# Patient Record
Sex: MALE | Race: WHITE | NOT HISPANIC OR LATINO | URBAN - METROPOLITAN AREA
[De-identification: names, ages, dates, MRNs, and addresses within clinical notes are randomized per-mention and may not be internally consistent; named-entity substitution may affect disease eponyms.]

---

## 2024-07-04 ENCOUNTER — CLAIMS CREATED FROM THE CLAIM WINDOW (OUTPATIENT)
Dept: URBAN - METROPOLITAN AREA MEDICAL CENTER 1 | Facility: MEDICAL CENTER | Age: 24
End: 2024-07-04
Payer: COMMERCIAL

## 2024-07-04 DIAGNOSIS — R10.84 ABDOMINAL CRAMPING, GENERALIZED: ICD-10-CM

## 2024-07-04 DIAGNOSIS — K50.811 CROHN'S DISEASE OF BOTH SMALL AND LARGE INTESTINE WITH RECTAL BLEEDING: ICD-10-CM

## 2024-07-04 PROCEDURE — 99222 1ST HOSP IP/OBS MODERATE 55: CPT | Performed by: INTERNAL MEDICINE

## 2024-07-04 PROCEDURE — 99254 IP/OBS CNSLTJ NEW/EST MOD 60: CPT | Performed by: INTERNAL MEDICINE

## 2024-07-04 PROCEDURE — G8427 DOCREV CUR MEDS BY ELIG CLIN: HCPCS | Performed by: INTERNAL MEDICINE

## 2024-07-04 PROCEDURE — 99204 OFFICE O/P NEW MOD 45 MIN: CPT | Performed by: INTERNAL MEDICINE

## 2024-07-05 ENCOUNTER — CLAIMS CREATED FROM THE CLAIM WINDOW (OUTPATIENT)
Dept: URBAN - METROPOLITAN AREA MEDICAL CENTER 1 | Facility: MEDICAL CENTER | Age: 24
End: 2024-07-05
Payer: COMMERCIAL

## 2024-07-05 DIAGNOSIS — K50.811 CROHN'S DISEASE OF BOTH SMALL AND LARGE INTESTINE WITH RECTAL BLEEDING: ICD-10-CM

## 2024-07-05 DIAGNOSIS — R10.84 ABDOMINAL CRAMPING, GENERALIZED: ICD-10-CM

## 2024-07-05 PROCEDURE — 99214 OFFICE O/P EST MOD 30 MIN: CPT | Performed by: INTERNAL MEDICINE

## 2024-07-05 PROCEDURE — 99232 SBSQ HOSP IP/OBS MODERATE 35: CPT | Performed by: INTERNAL MEDICINE

## 2024-07-08 ENCOUNTER — TELEPHONE ENCOUNTER (OUTPATIENT)
Dept: URBAN - NONMETROPOLITAN AREA CLINIC 2 | Facility: CLINIC | Age: 24
End: 2024-07-08

## 2024-07-10 ENCOUNTER — OFFICE VISIT (OUTPATIENT)
Dept: URBAN - NONMETROPOLITAN AREA CLINIC 13 | Facility: CLINIC | Age: 24
End: 2024-07-10

## 2024-07-10 ENCOUNTER — LAB OUTSIDE AN ENCOUNTER (OUTPATIENT)
Dept: URBAN - NONMETROPOLITAN AREA CLINIC 2 | Facility: CLINIC | Age: 24
End: 2024-07-10

## 2024-07-10 ENCOUNTER — LAB OUTSIDE AN ENCOUNTER (OUTPATIENT)
Dept: URBAN - NONMETROPOLITAN AREA CLINIC 13 | Facility: CLINIC | Age: 24
End: 2024-07-10

## 2024-07-10 ENCOUNTER — TELEPHONE ENCOUNTER (OUTPATIENT)
Dept: URBAN - NONMETROPOLITAN AREA CLINIC 2 | Facility: CLINIC | Age: 24
End: 2024-07-10

## 2024-07-10 ENCOUNTER — DASHBOARD ENCOUNTERS (OUTPATIENT)
Age: 24
End: 2024-07-10

## 2024-07-10 ENCOUNTER — OFFICE VISIT (OUTPATIENT)
Dept: URBAN - NONMETROPOLITAN AREA CLINIC 13 | Facility: CLINIC | Age: 24
End: 2024-07-10
Payer: COMMERCIAL

## 2024-07-10 VITALS — BODY MASS INDEX: 26.51 KG/M2 | WEIGHT: 185.2 LBS | HEIGHT: 70 IN

## 2024-07-10 DIAGNOSIS — Z98.890 HISTORY OF COLONOSCOPY: ICD-10-CM

## 2024-07-10 DIAGNOSIS — M25.50 MULTIPLE JOINT PAIN: ICD-10-CM

## 2024-07-10 DIAGNOSIS — K50.014 CROHN'S DISEASE OF SMALL INTESTINE WITH ABSCESS: ICD-10-CM

## 2024-07-10 PROBLEM — 56689002: Status: ACTIVE | Noted: 2024-07-10

## 2024-07-10 PROBLEM — 35678005: Status: ACTIVE | Noted: 2024-07-10

## 2024-07-10 PROBLEM — 851000119109: Status: ACTIVE | Noted: 2024-07-10

## 2024-07-10 LAB — HEPATITIS B SURFACE ANTIGEN: (no result)

## 2024-07-10 PROCEDURE — 99214 OFFICE O/P EST MOD 30 MIN: CPT | Performed by: NURSE PRACTITIONER

## 2024-07-10 RX ORDER — SODIUM, POTASSIUM,MAG SULFATES 17.5-3.13G
177 MILLILITER SOLUTION, RECONSTITUTED, ORAL ORAL
Qty: 1 UNSPECIFIED | Refills: 0 | OUTPATIENT
Start: 2024-07-10 | End: 2024-07-11

## 2024-07-10 RX ORDER — ESCITALOPRAM OXALATE 10 MG/1
1 TABLET TABLET, FILM COATED ORAL ONCE A DAY
Status: ACTIVE | COMMUNITY

## 2024-07-10 NOTE — HPI-TODAY'S VISIT:
7/10/2024 Rolando presents for [facility] follow-up.  He was diagnosed with Crohn's disease in 2021 with an abscess of the terminal ileum.  This was managed medically.  He was placed on infliximab 5 mg/kg every 8 weeks initially.  He was transition to Avsola due to insurance change.  His colonoscopy in 2022 shows no active disease endoscopically or histologically.  He missed a dose in summer 2023 of his infliximab and developed antibodies.  He started Stelara in July 2023 and has been compliant with his maintenance therapy at 90 mcg every 8 weeks.  Early July 2024 he developed abdominal pain nausea vomiting and diarrhea.  He presented to the emergency department with ileitis on CT scan.  He was treated with steroids initially.  Yesterday he developed a fever over.  He went back to the emergency room with no profound leukocytosis his lactate and his CRP were normal.  They started him on Flagyl and Cipro.  His abdomen is still somewhat tender but no further fever.  He is tolerating low residue diet.  Today he agrees to labs, will schedule colonoscopy from when he gets back from [place] and discuss dose escalation of his Stelara versus Skyrizi versus Rinvoq.  MB

## 2024-07-13 LAB
HEPATITIS B CORE TOTAL ANTIBODY: NONREACTIVE
MITOGEN-NIL: 1.66
NIL: 0.14
QUANTIFERON-TB PLUS,1T: NEGATIVE
TB1-NIL: 0.01
TB2-NIL: 0.01

## 2024-07-15 ENCOUNTER — TELEPHONE ENCOUNTER (OUTPATIENT)
Dept: URBAN - NONMETROPOLITAN AREA CLINIC 13 | Facility: CLINIC | Age: 24
End: 2024-07-15

## 2024-07-15 ENCOUNTER — OFFICE VISIT (OUTPATIENT)
Dept: URBAN - NONMETROPOLITAN AREA SURGERY CENTER 1 | Facility: SURGERY CENTER | Age: 24
End: 2024-07-15

## 2024-07-18 ENCOUNTER — OFFICE VISIT (OUTPATIENT)
Dept: URBAN - NONMETROPOLITAN AREA CLINIC 2 | Facility: CLINIC | Age: 24
End: 2024-07-18

## 2024-07-19 LAB — MISC RESULTS: (no result)

## 2024-07-22 ENCOUNTER — OFFICE VISIT (OUTPATIENT)
Dept: URBAN - NONMETROPOLITAN AREA SURGERY CENTER 1 | Facility: SURGERY CENTER | Age: 24
End: 2024-07-22

## 2024-07-23 ENCOUNTER — TELEPHONE ENCOUNTER (OUTPATIENT)
Dept: URBAN - NONMETROPOLITAN AREA CLINIC 2 | Facility: CLINIC | Age: 24
End: 2024-07-23

## 2024-08-01 ENCOUNTER — LAB OUTSIDE AN ENCOUNTER (OUTPATIENT)
Dept: URBAN - NONMETROPOLITAN AREA CLINIC 2 | Facility: CLINIC | Age: 24
End: 2024-08-01

## 2024-08-01 ENCOUNTER — OFFICE VISIT (OUTPATIENT)
Dept: URBAN - METROPOLITAN AREA MEDICAL CENTER 1 | Facility: MEDICAL CENTER | Age: 24
End: 2024-08-01
Payer: COMMERCIAL

## 2024-08-01 DIAGNOSIS — K50.80 CROHN'S COLITIS: ICD-10-CM

## 2024-08-01 PROCEDURE — 45380 COLONOSCOPY AND BIOPSY: CPT | Performed by: INTERNAL MEDICINE

## 2024-08-01 RX ORDER — ESCITALOPRAM OXALATE 10 MG/1
1 TABLET TABLET, FILM COATED ORAL ONCE A DAY
Status: ACTIVE | COMMUNITY

## 2024-08-02 LAB
AP CASE REPORT: (no result)
AP CLINICAL INFORMATION: (no result)
AP FINAL DIAGNOSIS: (no result)
AP GROSS DESCRIPTION: (no result)
AP MICROSCOPIC DESCRIPTION: (no result)

## 2024-09-03 ENCOUNTER — TELEPHONE ENCOUNTER (OUTPATIENT)
Dept: URBAN - NONMETROPOLITAN AREA CLINIC 2 | Facility: CLINIC | Age: 24
End: 2024-09-03

## 2024-09-11 LAB
A/G RATIO: 2.1
ABSOLUTE BASOPHILS: 51
ABSOLUTE EOSINOPHILS: 51
ABSOLUTE LYMPHOCYTES: 1183
ABSOLUTE MONOCYTES: 490
ABSOLUTE NEUTROPHILS: 3325
ALBUMIN: 4.1
ALKALINE PHOSPHATASE: 65
ALT (SGPT): 13
AST (SGOT): 12
BASOPHILS: 1
BILIRUBIN, TOTAL: 0.9
BUN/CREATININE RATIO: (no result)
BUN: 10
C-REACTIVE PROTEIN, QUANT: <3
CALCIUM: 9
CARBON DIOXIDE, TOTAL: 27
CHLORIDE: 104
CREATININE: 1.02
EGFR: 106
EOSINOPHILS: 1
GLOBULIN, TOTAL: 2
GLUCOSE: 126
HEMATOCRIT: 41
HEMOGLOBIN: 13.1
LYMPHOCYTES: 23.2
MCH: 27.3
MCHC: 32
MCV: 85.4
MONOCYTES: 9.6
MPV: 11.9
NEUTROPHILS: 65.2
PLATELET COUNT: 244
POTASSIUM: 3.5
PROTEIN, TOTAL: 6.1
RDW: 12.8
RED BLOOD CELL COUNT: 4.8
SED RATE BY MODIFIED: 2
SODIUM: 143
USTEKINUMAB AB, S: <10
USTEKINUMAB: 5.7
WHITE BLOOD CELL COUNT: 5.1

## 2024-09-13 ENCOUNTER — WEB ENCOUNTER (OUTPATIENT)
Dept: URBAN - NONMETROPOLITAN AREA CLINIC 2 | Facility: CLINIC | Age: 24
End: 2024-09-13

## 2024-09-13 ENCOUNTER — TELEPHONE ENCOUNTER (OUTPATIENT)
Dept: URBAN - NONMETROPOLITAN AREA CLINIC 2 | Facility: CLINIC | Age: 24
End: 2024-09-13

## 2024-09-16 LAB
CALPROTECTIN, FECAL: 35
CLOSTRIDIUM DIFFICILE: (no result)
LEUKOCYTES: (no result)
SALMONELLA AND SHIGELLA, CULTURE: (no result)
SHIGA TOXINS, EIA W/RFL TO E.COLI O157 CULTURE: (no result)

## 2024-10-03 ENCOUNTER — OFFICE VISIT (OUTPATIENT)
Dept: URBAN - NONMETROPOLITAN AREA CLINIC 2 | Facility: CLINIC | Age: 24
End: 2024-10-03
Payer: COMMERCIAL

## 2024-10-03 VITALS
SYSTOLIC BLOOD PRESSURE: 107 MMHG | DIASTOLIC BLOOD PRESSURE: 71 MMHG | WEIGHT: 180 LBS | TEMPERATURE: 98 F | BODY MASS INDEX: 25.77 KG/M2 | HEIGHT: 70 IN | HEART RATE: 82 BPM

## 2024-10-03 DIAGNOSIS — Z98.890 HISTORY OF COLONOSCOPY: ICD-10-CM

## 2024-10-03 DIAGNOSIS — M25.50 MULTIPLE JOINT PAIN: ICD-10-CM

## 2024-10-03 DIAGNOSIS — K50.014 CROHN'S DISEASE OF SMALL INTESTINE WITH ABSCESS: ICD-10-CM

## 2024-10-03 PROCEDURE — 99214 OFFICE O/P EST MOD 30 MIN: CPT | Performed by: NURSE PRACTITIONER

## 2024-10-03 RX ORDER — ESCITALOPRAM OXALATE 10 MG/1
1 TABLET TABLET, FILM COATED ORAL ONCE A DAY
Status: ACTIVE | COMMUNITY

## 2024-10-03 NOTE — HPI-TODAY'S VISIT:
7/10/2024 Rolando presents for [facility] follow-up.  He was diagnosed with Crohn's disease in 2021 with an abscess of the terminal ileum.  This was managed medically.  He was placed on infliximab 5 mg/kg every 8 weeks initially.  He was transition to Avsola due to insurance change.  His colonoscopy in 2022 shows no active disease endoscopically or histologically.  He missed a dose in summer 2023 of his infliximab and developed antibodies.  He started Stelara in July 2023 and has been compliant with his maintenance therapy at 90 mcg every 8 weeks.  Early July 2024 he developed abdominal pain nausea vomiting and diarrhea.  He presented to the emergency department with ileitis on CT scan.  He was treated with steroids initially.  Yesterday he developed a fever over.  He went back to the emergency room with no profound leukocytosis his lactate and his CRP were normal.  They started him on Flagyl and Cipro.  His abdomen is still somewhat tender but no further fever.  He is tolerating low residue diet.  Today he agrees to labs, will schedule colonoscopy from when he gets back from [place] and discuss dose escalation of his Stelara versus Skyrizi versus Rinvoq.  MB 10/3/2024 Rolando presents for follow-up of Crohn's disease.  Since his last visit his colonoscopy is normal including his mucosal changes and small bowel and colonic biopsies.  He is on Stelara 90 mcg every 8 weeks.  He had a small flare 3 weeks ago with abdominal cramping and diarrhea.  He started prednisone over the weekend.  His blood work including his Stelara levels and his fecal calprotectin were normal.  However he was already on steroids.  He is off steroids now for 2 weeks, his bowels are moving regularly.  He denies any abdominal pain.  He took his Stelara dose at the end of September.  Today he is doing much better in regard to his GI complaints.  MB

## 2024-12-02 ENCOUNTER — TELEPHONE ENCOUNTER (OUTPATIENT)
Dept: URBAN - NONMETROPOLITAN AREA CLINIC 2 | Facility: CLINIC | Age: 24
End: 2024-12-02

## 2024-12-03 ENCOUNTER — TELEPHONE ENCOUNTER (OUTPATIENT)
Dept: URBAN - NONMETROPOLITAN AREA CLINIC 2 | Facility: CLINIC | Age: 24
End: 2024-12-03

## 2024-12-03 ENCOUNTER — OFFICE VISIT (OUTPATIENT)
Dept: URBAN - NONMETROPOLITAN AREA CLINIC 2 | Facility: CLINIC | Age: 24
End: 2024-12-03
Payer: COMMERCIAL

## 2024-12-03 VITALS
DIASTOLIC BLOOD PRESSURE: 73 MMHG | HEIGHT: 70 IN | SYSTOLIC BLOOD PRESSURE: 113 MMHG | WEIGHT: 173.6 LBS | HEART RATE: 66 BPM | BODY MASS INDEX: 24.85 KG/M2

## 2024-12-03 DIAGNOSIS — Z98.890 HISTORY OF COLONOSCOPY: ICD-10-CM

## 2024-12-03 DIAGNOSIS — R63.4 WEIGHT LOSS: ICD-10-CM

## 2024-12-03 DIAGNOSIS — K50.014 CROHN'S DISEASE OF SMALL INTESTINE WITH ABSCESS: ICD-10-CM

## 2024-12-03 DIAGNOSIS — R17 ELEVATED BILIRUBIN: ICD-10-CM

## 2024-12-03 DIAGNOSIS — M25.50 MULTIPLE JOINT PAIN: ICD-10-CM

## 2024-12-03 PROCEDURE — 99214 OFFICE O/P EST MOD 30 MIN: CPT | Performed by: NURSE PRACTITIONER

## 2024-12-03 RX ORDER — BUDESONIDE 3 MG/1
3 CAPSULES CAPSULE, DELAYED RELEASE PELLETS ORAL ONCE A DAY
Qty: 168 CAPSULE | Refills: 0 | OUTPATIENT
Start: 2024-12-03

## 2024-12-03 RX ORDER — METRONIDAZOLE 500 MG/1
1 TABLET TABLET ORAL THREE TIMES A DAY
Qty: 30 TABLET | Refills: 0 | OUTPATIENT
Start: 2024-12-03 | End: 2024-12-13

## 2024-12-03 RX ORDER — ESCITALOPRAM OXALATE 10 MG/1
1 TABLET TABLET, FILM COATED ORAL ONCE A DAY
Status: ACTIVE | COMMUNITY

## 2024-12-03 RX ORDER — ESCITALOPRAM 10 MG/1
TAKE 1 AND 1/2 TABLETS BY MOUTH DAILY TABLET, FILM COATED ORAL
Qty: 45 UNSPECIFIED | Refills: 3 | Status: ACTIVE | COMMUNITY

## 2024-12-03 RX ORDER — PREDNISONE 10 MG/1
TAKE THREE TABLETS BY MOUTH TWICE A DAY FOR 5 DAYS THEN TAKE TWO TABLETS BY MOUTH TWICE A DAY FOR 3 DAYS THEN TAKE ONE TABLET BY MOUTH TWICE TABLET ORAL
Qty: 51 UNSPECIFIED | Refills: 0 | Status: ACTIVE | COMMUNITY

## 2024-12-03 NOTE — HPI-TODAY'S VISIT:
7/10/2024 Rolando presents for [facility] follow-up.  He was diagnosed with Crohn's disease in 2021 with an abscess of the terminal ileum.  This was managed medically.  He was placed on infliximab 5 mg/kg every 8 weeks initially.  He was transition to Avsola due to insurance change.  His colonoscopy in 2022 shows no active disease endoscopically or histologically.  He missed a dose in summer 2023 of his infliximab and developed antibodies.  He started Stelara in July 2023 and has been compliant with his maintenance therapy at 90 mcg every 8 weeks.  Early July 2024 he developed abdominal pain nausea vomiting and diarrhea.  He presented to the emergency department with ileitis on CT scan.  He was treated with steroids initially.  Yesterday he developed a fever over.  He went back to the emergency room with no profound leukocytosis his lactate and his CRP were normal.  They started him on Flagyl and Cipro.  His abdomen is still somewhat tender but no further fever.  He is tolerating low residue diet.  Today he agrees to labs, will schedule colonoscopy from when he gets back from [place] and discuss dose escalation of his Stelara versus Skyrizi versus Rinvoq.  MB 10/3/2024 Rolando presents for follow-up of Crohn's disease.  Since his last visit his colonoscopy is normal including his mucosal changes and small bowel and colonic biopsies.  He is on Stelara 90 mcg every 8 weeks.  He had a small flare 3 weeks ago with abdominal cramping and diarrhea.  He started prednisone over the weekend.  His blood work including his Stelara levels and his fecal calprotectin were normal.  However he was already on steroids.  He is off steroids now for 2 weeks, his bowels are moving regularly.  He denies any abdominal pain.  He took his Stelara dose at the end of September.  Today he is doing much better in regard to his GI complaints.  MB 12/3/2024 Rolando presents for follow-up.  Since his last visit he was doing well.  His took his Stelara dose on Thanksgiving.  The following Sunday morning he woke up and intense abdominal pain.  He typically does not have diarrhea with his flares he was actually feeling somewhat constipated.  He tried to sip on some Gatorade and MiraLAX and go to work, his pain became worse and he went to the emergency room.  His CBC is normal, his CMP shows an elevated T. bili.  His CT scan shows segmental ileitis with acute on chronic inflammation.  His colonoscopy in August shows normal terminal ileum normal colon and normal random biopsies.  He has been compliant with his Stelara every 8 weeks.  He started prednisone and was given Cipro.  His abdominal pain is improving.  Today he agrees to add Flagyl as he is very concerned about a recurrent abscess, we will transition him from p.o. steroids to budesonide 9 mg daily for 8 weeks.  We will draw his Stelara titers, if he has developed antibodies consider dose escalation versus drug alternative, if his serum drug level is normal with no antibodies would also consider alternative therapy with written Lavern versus Skyrizi.  Will discuss with Dr. Carreno.  MB

## 2024-12-07 ENCOUNTER — WEB ENCOUNTER (OUTPATIENT)
Dept: URBAN - NONMETROPOLITAN AREA CLINIC 2 | Facility: CLINIC | Age: 24
End: 2024-12-07

## 2024-12-11 LAB
ABSOLUTE BASOPHILS: 9
ABSOLUTE EOSINOPHILS: 0
ABSOLUTE LYMPHOCYTES: 517
ABSOLUTE MONOCYTES: 432
ABSOLUTE NEUTROPHILS: 8441
ALBUMIN/GLOBULIN RATIO: 2
ALBUMIN: 4.7
ALKALINE PHOSPHATASE: 68
ALT (SGPT): 13
AST (SGOT): 11
BASOPHILS: 0.1
BILIRUBIN, DIRECT: 0.2
BILIRUBIN, INDIRECT: 0.6
BILIRUBIN, TOTAL: 0.8
BUN/CREATININE RATIO: (no result)
CALCIUM: 9.6
CARBON DIOXIDE: 28
CHLORIDE: 103
CREATININE: 0.9
EGFR: 123
EOSINOPHILS: 0
GLOBULIN: 2.3
GLUCOSE: 117
HEMATOCRIT: 44.3
HEMOGLOBIN: 13.9
HEPATITIS B CORE AB TOTAL: (no result)
HEPATITIS B SURFACE ANTIGEN: (no result)
LYMPHOCYTES: 5.5
MCH: 26.6
MCHC: 31.4
MCV: 84.9
MITOGEN-NIL: 0.69
MONOCYTES: 4.6
MPV: 12.6
NEUTROPHILS: 89.8
PLATELET COUNT: 263
POTASSIUM: 4.5
PROTEIN, TOTAL: 7
QUANTIFERON NIL VALUE: 0.01
QUANTIFERON TB1 AG VALUE: 0
QUANTIFERON TB2 AG VALUE: 0.01
QUANTIFERON-TB GOLD PLUS: NEGATIVE
RDW: 15.2
RED BLOOD CELL COUNT: 5.22
SODIUM: 140
TSH W/REFLEX TO FT4: 0.41
UREA NITROGEN (BUN): 14
USTEKINUMAB AB, S: <10
USTEKINUMAB: >10
WHITE BLOOD CELL COUNT: 9.4

## 2024-12-17 ENCOUNTER — WEB ENCOUNTER (OUTPATIENT)
Dept: URBAN - NONMETROPOLITAN AREA CLINIC 2 | Facility: CLINIC | Age: 24
End: 2024-12-17

## 2024-12-20 ENCOUNTER — OFFICE VISIT (OUTPATIENT)
Dept: URBAN - METROPOLITAN AREA TELEHEALTH 2 | Facility: TELEHEALTH | Age: 24
End: 2024-12-20
Payer: COMMERCIAL

## 2024-12-20 DIAGNOSIS — K50.014 CROHN'S DISEASE OF SMALL INTESTINE WITH ABSCESS: ICD-10-CM

## 2024-12-20 DIAGNOSIS — R17 ELEVATED BILIRUBIN: ICD-10-CM

## 2024-12-20 DIAGNOSIS — Z98.890 HISTORY OF COLONOSCOPY: ICD-10-CM

## 2024-12-20 DIAGNOSIS — R63.4 WEIGHT LOSS: ICD-10-CM

## 2024-12-20 DIAGNOSIS — M25.50 MULTIPLE JOINT PAIN: ICD-10-CM

## 2024-12-20 PROBLEM — 89362005: Status: ACTIVE | Noted: 2024-12-03

## 2024-12-20 PROBLEM — 26165005: Status: ACTIVE | Noted: 2024-12-03

## 2024-12-20 PROCEDURE — 99214 OFFICE O/P EST MOD 30 MIN: CPT | Performed by: NURSE PRACTITIONER

## 2024-12-20 RX ORDER — PREDNISONE 10 MG/1
TAKE THREE TABLETS BY MOUTH TWICE A DAY FOR 5 DAYS THEN TAKE TWO TABLETS BY MOUTH TWICE A DAY FOR 3 DAYS THEN TAKE ONE TABLET BY MOUTH TWICE TABLET ORAL
Qty: 51 UNSPECIFIED | Refills: 0 | Status: ACTIVE | COMMUNITY

## 2024-12-20 RX ORDER — ESCITALOPRAM 10 MG/1
TAKE 1 AND 1/2 TABLETS BY MOUTH DAILY TABLET, FILM COATED ORAL
Qty: 45 UNSPECIFIED | Refills: 3 | Status: ACTIVE | COMMUNITY

## 2024-12-20 RX ORDER — ESCITALOPRAM OXALATE 10 MG/1
1 TABLET TABLET, FILM COATED ORAL ONCE A DAY
Status: ACTIVE | COMMUNITY

## 2024-12-20 RX ORDER — UPADACITINIB 15 MG/1
1 TABLET TABLET, EXTENDED RELEASE ORAL ONCE A DAY
Qty: 90 TABLET | Refills: 3 | OUTPATIENT
Start: 2024-12-20 | End: 2025-12-15

## 2024-12-20 RX ORDER — UPADACITINIB 45 MG/1
1 TABLET DAILY FOR 12 WEEKS INDUCTION TABLET, EXTENDED RELEASE ORAL ONCE DAILY
Qty: 30 TABLETS | Refills: 2 | OUTPATIENT
Start: 2024-12-20 | End: 2025-03-20

## 2024-12-20 RX ORDER — BUDESONIDE 3 MG/1
3 CAPSULES CAPSULE, DELAYED RELEASE PELLETS ORAL ONCE A DAY
Qty: 168 CAPSULE | Refills: 0 | Status: ACTIVE | COMMUNITY
Start: 2024-12-03

## 2024-12-20 NOTE — HPI-TODAY'S VISIT:
7/10/2024 Rolando presents for [facility] follow-up.  He was diagnosed with Crohn's disease in 2021 with an abscess of the terminal ileum.  This was managed medically.  He was placed on infliximab 5 mg/kg every 8 weeks initially.  He was transition to Avsola due to insurance change.  His colonoscopy in 2022 shows no active disease endoscopically or histologically.  He missed a dose in summer 2023 of his infliximab and developed antibodies.  He started Stelara in July 2023 and has been compliant with his maintenance therapy at 90 mcg every 8 weeks.  Early July 2024 he developed abdominal pain nausea vomiting and diarrhea.  He presented to the emergency department with ileitis on CT scan.  He was treated with steroids initially.  Yesterday he developed a fever over.  He went back to the emergency room with no profound leukocytosis his lactate and his CRP were normal.  They started him on Flagyl and Cipro.  His abdomen is still somewhat tender but no further fever.  He is tolerating low residue diet.  Today he agrees to labs, will schedule colonoscopy from when he gets back from [place] and discuss dose escalation of his Stelara versus Skyrizi versus Rinvoq.  MB 10/3/2024 Rolando presents for follow-up of Crohn's disease.  Since his last visit his colonoscopy is normal including his mucosal changes and small bowel and colonic biopsies.  He is on Stelara 90 mcg every 8 weeks.  He had a small flare 3 weeks ago with abdominal cramping and diarrhea.  He started prednisone over the weekend.  His blood work including his Stelara levels and his fecal calprotectin were normal.  However he was already on steroids.  He is off steroids now for 2 weeks, his bowels are moving regularly.  He denies any abdominal pain.  He took his Stelara dose at the end of September.  Today he is doing much better in regard to his GI complaints.  MB 12/3/2024 Rolando presents for follow-up.  Since his last visit he was doing well.  His took his Stelara dose on Thanksgiving.  The following Sunday morning he woke up and intense abdominal pain.  He typically does not have diarrhea with his flares he was actually feeling somewhat constipated.  He tried to sip on some Gatorade and MiraLAX and go to work, his pain became worse and he went to the emergency room.  His CBC is normal, his CMP shows an elevated T. bili.  His CT scan shows segmental ileitis with acute on chronic inflammation.  His colonoscopy in August shows normal terminal ileum normal colon and normal random biopsies.  He has been compliant with his Stelara every 8 weeks.  He started prednisone and was given Cipro.  His abdominal pain is improving.  Today he agrees to add Flagyl as he is very concerned about a recurrent abscess, we will transition him from p.o. steroids to budesonide 9 mg daily for 8 weeks.  We will draw his Stelara titers, if he has developed antibodies consider dose escalation versus drug alternative, if his serum drug level is normal with no antibodies would also consider alternative therapy with written Lavern versus Skyrizi.  Will discuss with Dr. Carreno.  MB 12/20/2024 Rolando presents for follow-up of Crohn's disease. Since his last visit his Stelara titers show appropriate drug level and no antibodies. He is compliant with his dosing and has had 2 flares this year. He has completed the prednisone and antibiotics and is just on budesonide 9 mg daily. His abdominal pain has resolved. Today given his active disease on imaging and persistent symptoms despite his flare in July we will plan to transition to Rinvoq 45 mg daily for 12 weeks and then 15 mg daily for maintenance. MB

## 2024-12-23 ENCOUNTER — TELEPHONE ENCOUNTER (OUTPATIENT)
Dept: URBAN - METROPOLITAN AREA CLINIC 92 | Facility: CLINIC | Age: 24
End: 2024-12-23

## 2025-01-08 ENCOUNTER — TELEPHONE ENCOUNTER (OUTPATIENT)
Dept: URBAN - NONMETROPOLITAN AREA CLINIC 2 | Facility: CLINIC | Age: 25
End: 2025-01-08

## 2025-01-08 RX ORDER — UPADACITINIB 15 MG/1
1 TABLET TABLET, EXTENDED RELEASE ORAL ONCE A DAY
Qty: 90 TABLET | Refills: 3
Start: 2024-12-20 | End: 2026-01-03

## 2025-01-08 RX ORDER — UPADACITINIB 45 MG/1
1 TABLET DAILY FOR 12 WEEKS INDUCTION TABLET, EXTENDED RELEASE ORAL ONCE DAILY
Qty: 30 TABLETS | Refills: 2
Start: 2024-12-20 | End: 2025-04-08

## 2025-01-22 ENCOUNTER — OFFICE VISIT (OUTPATIENT)
Dept: URBAN - NONMETROPOLITAN AREA CLINIC 13 | Facility: CLINIC | Age: 25
End: 2025-01-22
Payer: COMMERCIAL

## 2025-01-22 ENCOUNTER — WEB ENCOUNTER (OUTPATIENT)
Dept: URBAN - NONMETROPOLITAN AREA CLINIC 2 | Facility: CLINIC | Age: 25
End: 2025-01-22

## 2025-01-22 DIAGNOSIS — K50.014 CROHN'S DISEASE OF SMALL INTESTINE WITH ABSCESS: ICD-10-CM

## 2025-01-22 DIAGNOSIS — Z98.890 HISTORY OF COLONOSCOPY: ICD-10-CM

## 2025-01-22 DIAGNOSIS — R63.4 WEIGHT LOSS: ICD-10-CM

## 2025-01-22 DIAGNOSIS — M25.50 MULTIPLE JOINT PAIN: ICD-10-CM

## 2025-01-22 DIAGNOSIS — R17 ELEVATED BILIRUBIN: ICD-10-CM

## 2025-01-22 PROCEDURE — 99214 OFFICE O/P EST MOD 30 MIN: CPT | Performed by: NURSE PRACTITIONER

## 2025-01-22 RX ORDER — BUDESONIDE 3 MG/1
3 CAPSULES CAPSULE, DELAYED RELEASE PELLETS ORAL ONCE A DAY
Qty: 168 CAPSULE | Refills: 0 | Status: ACTIVE | COMMUNITY
Start: 2024-12-03

## 2025-01-22 RX ORDER — ESCITALOPRAM OXALATE 10 MG/1
1 TABLET TABLET, FILM COATED ORAL ONCE A DAY
Status: ACTIVE | COMMUNITY

## 2025-01-22 RX ORDER — PREDNISONE 10 MG/1
TAKE THREE TABLETS BY MOUTH TWICE A DAY FOR 5 DAYS THEN TAKE TWO TABLETS BY MOUTH TWICE A DAY FOR 3 DAYS THEN TAKE ONE TABLET BY MOUTH TWICE TABLET ORAL
Qty: 51 UNSPECIFIED | Refills: 0 | Status: ACTIVE | COMMUNITY

## 2025-01-22 RX ORDER — UPADACITINIB 15 MG/1
1 TABLET TABLET, EXTENDED RELEASE ORAL ONCE A DAY
Qty: 90 TABLET | Refills: 3 | Status: ACTIVE | COMMUNITY
Start: 2024-12-20 | End: 2026-01-03

## 2025-01-22 RX ORDER — UPADACITINIB 45 MG/1
1 TABLET DAILY FOR 12 WEEKS INDUCTION TABLET, EXTENDED RELEASE ORAL ONCE DAILY
Qty: 30 TABLETS | Refills: 2 | Status: ACTIVE | COMMUNITY
Start: 2024-12-20 | End: 2025-04-08

## 2025-01-22 RX ORDER — UPADACITINIB 15 MG/1
1 TABLET TABLET, EXTENDED RELEASE ORAL ONCE A DAY
Qty: 90 TABLET | Refills: 3
Start: 2024-12-20 | End: 2026-01-17

## 2025-01-22 RX ORDER — UPADACITINIB 45 MG/1
1 TABLET DAILY FOR 12 WEEKS INDUCTION TABLET, EXTENDED RELEASE ORAL ONCE DAILY
Qty: 30 TABLETS | Refills: 2
Start: 2024-12-20 | End: 2025-04-22

## 2025-01-22 RX ORDER — ESCITALOPRAM 10 MG/1
TAKE 1 AND 1/2 TABLETS BY MOUTH DAILY TABLET, FILM COATED ORAL
Qty: 45 UNSPECIFIED | Refills: 3 | Status: ACTIVE | COMMUNITY

## 2025-01-22 NOTE — HPI-TODAY'S VISIT:
7/10/2024 Rolando presents for [facility] follow-up.  He was diagnosed with Crohn's disease in 2021 with an abscess of the terminal ileum.  This was managed medically.  He was placed on infliximab 5 mg/kg every 8 weeks initially.  He was transition to Avsola due to insurance change.  His colonoscopy in 2022 shows no active disease endoscopically or histologically.  He missed a dose in summer 2023 of his infliximab and developed antibodies.  He started Stelara in July 2023 and has been compliant with his maintenance therapy at 90 mcg every 8 weeks.  Early July 2024 he developed abdominal pain nausea vomiting and diarrhea.  He presented to the emergency department with ileitis on CT scan.  He was treated with steroids initially.  Yesterday he developed a fever over.  He went back to the emergency room with no profound leukocytosis his lactate and his CRP were normal.  They started him on Flagyl and Cipro.  His abdomen is still somewhat tender but no further fever.  He is tolerating low residue diet.  Today he agrees to labs, will schedule colonoscopy from when he gets back from [place] and discuss dose escalation of his Stelara versus Skyrizi versus Rinvoq.  MB 10/3/2024 Rolando presents for follow-up of Crohn's disease.  Since his last visit his colonoscopy is normal including his mucosal changes and small bowel and colonic biopsies.  He is on Stelara 90 mcg every 8 weeks.  He had a small flare 3 weeks ago with abdominal cramping and diarrhea.  He started prednisone over the weekend.  His blood work including his Stelara levels and his fecal calprotectin were normal.  However he was already on steroids.  He is off steroids now for 2 weeks, his bowels are moving regularly.  He denies any abdominal pain.  He took his Stelara dose at the end of September.  Today he is doing much better in regard to his GI complaints.  MB 12/3/2024 Rolando presents for follow-up.  Since his last visit he was doing well.  His took his Stelara dose on Thanksgiving.  The following Sunday morning he woke up and intense abdominal pain.  He typically does not have diarrhea with his flares he was actually feeling somewhat constipated.  He tried to sip on some Gatorade and MiraLAX and go to work, his pain became worse and he went to the emergency room.  His CBC is normal, his CMP shows an elevated T. bili.  His CT scan shows segmental ileitis with acute on chronic inflammation.  His colonoscopy in August shows normal terminal ileum normal colon and normal random biopsies.  He has been compliant with his Stelara every 8 weeks.  He started prednisone and was given Cipro.  His abdominal pain is improving.  Today he agrees to add Flagyl as he is very concerned about a recurrent abscess, we will transition him from p.o. steroids to budesonide 9 mg daily for 8 weeks.  We will draw his Stelara titers, if he has developed antibodies consider dose escalation versus drug alternative, if his serum drug level is normal with no antibodies would also consider alternative therapy with written Lavern versus Skyrizi.  Will discuss with Dr. Carreno.  MB 12/20/2024 Rolando presents for follow-up of Crohn's disease. Since his last visit his Stelara titers show appropriate drug level and no antibodies. He is compliant with his dosing and has had 2 flares this year. He has completed the prednisone and antibiotics and is just on budesonide 9 mg daily. His abdominal pain has resolved. Today given his active disease on imaging and persistent symptoms despite his flare in July we will plan to transition to Rinvoq 45 mg daily for 12 weeks and then 15 mg daily for maintenance. MB 1/ 20 2/2025 Rolando presents for follow-up of Crohn's disease.  Since his last visit he was approved in early January for Rinvoq 45 mg daily x 12 weeks for induction and 15 mg daily for maintenance.  The prescription was transferred to [facility] on January 8, he still has not heard from them.  The  has reached out.  I have given him [facility]'s number, he will message me later today if he is unable to hear from them.  He is having a soft bowel movement daily with no pain bleeding or mucus on budesonide, he will run out of this on January 31.  He has not taken a dose of Stelara since Thanksgiving.  Today we will follow-up on his written Lavern prescription, he will let me know if he has any further issues obtaining this as it has been approved.MB

## 2025-01-27 ENCOUNTER — WEB ENCOUNTER (OUTPATIENT)
Dept: URBAN - NONMETROPOLITAN AREA CLINIC 2 | Facility: CLINIC | Age: 25
End: 2025-01-27

## 2025-01-30 ENCOUNTER — OFFICE VISIT (OUTPATIENT)
Dept: URBAN - NONMETROPOLITAN AREA CLINIC 2 | Facility: CLINIC | Age: 25
End: 2025-01-30

## 2025-03-20 ENCOUNTER — WEB ENCOUNTER (OUTPATIENT)
Dept: URBAN - NONMETROPOLITAN AREA CLINIC 2 | Facility: CLINIC | Age: 25
End: 2025-03-20